# Patient Record
Sex: FEMALE | URBAN - NONMETROPOLITAN AREA
[De-identification: names, ages, dates, MRNs, and addresses within clinical notes are randomized per-mention and may not be internally consistent; named-entity substitution may affect disease eponyms.]

---

## 2017-06-01 ENCOUNTER — APPOINTMENT (OUTPATIENT)
Age: 62
Setting detail: DERMATOLOGY
End: 2017-06-06

## 2017-06-01 DIAGNOSIS — L40.0 PSORIASIS VULGARIS: ICD-10-CM

## 2017-06-01 DIAGNOSIS — Z79.899 OTHER LONG TERM (CURRENT) DRUG THERAPY: ICD-10-CM

## 2017-06-01 PROBLEM — L29.8 OTHER PRURITUS: Status: ACTIVE | Noted: 2017-06-01

## 2017-06-01 PROBLEM — L70.0 ACNE VULGARIS: Status: ACTIVE | Noted: 2017-06-01

## 2017-06-01 PROCEDURE — OTHER TREATMENT REGIMEN: OTHER

## 2017-06-01 PROCEDURE — OTHER ORDER TESTS: OTHER

## 2017-06-01 PROCEDURE — 99213 OFFICE O/P EST LOW 20 MIN: CPT

## 2017-06-01 PROCEDURE — OTHER PRESCRIPTION: OTHER

## 2017-06-01 PROCEDURE — OTHER HIGH RISK MEDICATION MONITORING: OTHER

## 2017-06-01 PROCEDURE — OTHER COUNSELING: OTHER

## 2017-06-01 RX ORDER — FOLIC ACID 1 MG
TABLET ORAL
Qty: 30 | Refills: 11 | Status: ERX

## 2017-06-01 RX ORDER — METHOTREXATE SODIUM 2.5 MG/1
TABLET ORAL
Qty: 20 | Refills: 2 | Status: ERX

## 2017-06-01 ASSESSMENT — LOCATION DETAILED DESCRIPTION DERM
LOCATION DETAILED: PERIUMBILICAL SKIN
LOCATION DETAILED: LEFT DISTAL POSTERIOR THIGH
LOCATION DETAILED: RIGHT ELBOW
LOCATION DETAILED: LEFT PROXIMAL DORSAL FOREARM
LOCATION DETAILED: RIGHT PROXIMAL DORSAL FOREARM
LOCATION DETAILED: INFERIOR LUMBAR SPINE
LOCATION DETAILED: LEFT ELBOW
LOCATION DETAILED: RIGHT DISTAL POSTERIOR THIGH

## 2017-06-01 ASSESSMENT — LOCATION SIMPLE DESCRIPTION DERM
LOCATION SIMPLE: LEFT POSTERIOR THIGH
LOCATION SIMPLE: RIGHT POSTERIOR THIGH
LOCATION SIMPLE: LEFT ELBOW
LOCATION SIMPLE: RIGHT ELBOW
LOCATION SIMPLE: RIGHT FOREARM
LOCATION SIMPLE: LOWER BACK
LOCATION SIMPLE: LEFT FOREARM
LOCATION SIMPLE: ABDOMEN

## 2017-06-01 ASSESSMENT — LOCATION ZONE DERM
LOCATION ZONE: LEG
LOCATION ZONE: ARM
LOCATION ZONE: TRUNK

## 2017-06-01 NOTE — PROCEDURE: TREATMENT REGIMEN
Plan: Patient is to have labs drawn in 3 months, if labs are \"OK\" to continue taking the methotrexate then we will send in another 3 month supply.\\nBiological treatment and risks briefly discussed. Patient prefers to stay with methotrexate. Other Instructions: Patient is to have labs drawn in 3 months, if labs are \"OK\" to continue taking the methotrexate then we will send in another 3 month supply.\\nBiological treatment and risks briefly discussed. Patient prefers to stay with methotrexate.

## 2017-09-08 RX ORDER — METHOTREXATE SODIUM 2.5 MG/1
5 TABLET ORAL
Qty: 20 | Refills: 2 | Status: ERX

## 2017-12-04 ENCOUNTER — APPOINTMENT (OUTPATIENT)
Age: 62
Setting detail: DERMATOLOGY
End: 2017-12-04

## 2017-12-04 DIAGNOSIS — Z79.899 OTHER LONG TERM (CURRENT) DRUG THERAPY: ICD-10-CM

## 2017-12-04 DIAGNOSIS — L40.0 PSORIASIS VULGARIS: ICD-10-CM

## 2017-12-04 PROCEDURE — OTHER COUNSELING: OTHER

## 2017-12-04 PROCEDURE — OTHER TREATMENT REGIMEN: OTHER

## 2017-12-04 PROCEDURE — OTHER PRESCRIPTION: OTHER

## 2017-12-04 PROCEDURE — OTHER HIGH RISK MEDICATION MONITORING: OTHER

## 2017-12-04 PROCEDURE — 99213 OFFICE O/P EST LOW 20 MIN: CPT

## 2017-12-04 RX ORDER — CLOBETASOL PROPIONATE 0.5 MG/G
CREAM TOPICAL BID
Qty: 1 | Refills: 2 | Status: ERX

## 2017-12-04 RX ORDER — CLOBETASOL PROPIONATE 0.5 MG/ML
SOLUTION TOPICAL
Qty: 1 | Refills: 3 | Status: ERX

## 2017-12-04 RX ORDER — CALCIPOTRIENE 50 UG/G
CREAM TOPICAL BID
Qty: 1 | Refills: 4 | Status: ERX

## 2017-12-04 ASSESSMENT — LOCATION SIMPLE DESCRIPTION DERM
LOCATION SIMPLE: RIGHT ELBOW
LOCATION SIMPLE: LEFT ELBOW

## 2017-12-04 ASSESSMENT — LOCATION ZONE DERM: LOCATION ZONE: ARM

## 2017-12-04 ASSESSMENT — LOCATION DETAILED DESCRIPTION DERM
LOCATION DETAILED: RIGHT ELBOW
LOCATION DETAILED: LEFT ELBOW

## 2017-12-04 ASSESSMENT — BSA PSORIASIS: % BODY COVERED IN PSORIASIS: 4

## 2017-12-04 NOTE — PROCEDURE: TREATMENT REGIMEN
Other Instructions: Will obtain labs from [facility] and send Rx if labs are ok Plan: Will obtain labs from [facility] and send Rx if labs are ok

## 2017-12-04 NOTE — PROCEDURE: TREATMENT REGIMEN
Continue Regimen: Methotrexate 2.5mg take 5 tablets once weekly (pending labs) \\nFolic Acid one tablet daily\\nClobetasol 0.05% topical cream on weekends only \\nDovonex 0.005% topical cream twice daily to psoriasis \\nClobetasol 0.05% scalp solution apply after washing hair 3 times weekly

## 2017-12-05 RX ORDER — METHOTREXATE SODIUM 2.5 MG/1
TABLET ORAL
Qty: 20 | Refills: 5 | Status: ERX

## 2018-06-14 ENCOUNTER — APPOINTMENT (OUTPATIENT)
Age: 63
Setting detail: DERMATOLOGY
End: 2018-06-14

## 2018-06-14 DIAGNOSIS — L40.0 PSORIASIS VULGARIS: ICD-10-CM

## 2018-06-14 DIAGNOSIS — Z79.899 OTHER LONG TERM (CURRENT) DRUG THERAPY: ICD-10-CM

## 2018-06-14 PROBLEM — L70.0 ACNE VULGARIS: Status: ACTIVE | Noted: 2018-06-14

## 2018-06-14 PROCEDURE — OTHER COUNSELING: OTHER

## 2018-06-14 PROCEDURE — OTHER PRESCRIPTION: OTHER

## 2018-06-14 PROCEDURE — OTHER TREATMENT REGIMEN: OTHER

## 2018-06-14 PROCEDURE — 99213 OFFICE O/P EST LOW 20 MIN: CPT

## 2018-06-14 PROCEDURE — OTHER MIPS QUALITY: OTHER

## 2018-06-14 PROCEDURE — OTHER ORDER TESTS: OTHER

## 2018-06-14 PROCEDURE — OTHER HIGH RISK MEDICATION MONITORING: OTHER

## 2018-06-14 PROCEDURE — OTHER LAB REPORTS REVIEWED: OTHER

## 2018-06-14 RX ORDER — METHOTREXATE SODIUM 2.5 MG/1
5 TABLET ORAL
Qty: 20 | Refills: 6 | Status: ERX

## 2018-06-14 RX ORDER — FOLIC ACID 1 MG
1 TABLET ORAL DAILY
Qty: 30 | Refills: 11 | Status: ERX

## 2018-06-14 ASSESSMENT — LOCATION DETAILED DESCRIPTION DERM
LOCATION DETAILED: LEFT DISTAL POSTERIOR UPPER ARM
LOCATION DETAILED: LEFT ELBOW
LOCATION DETAILED: RIGHT ULNAR PALM
LOCATION DETAILED: LEFT RADIAL PALM

## 2018-06-14 ASSESSMENT — LOCATION ZONE DERM
LOCATION ZONE: ARM
LOCATION ZONE: HAND

## 2018-06-14 ASSESSMENT — LOCATION SIMPLE DESCRIPTION DERM
LOCATION SIMPLE: LEFT ELBOW
LOCATION SIMPLE: RIGHT HAND
LOCATION SIMPLE: LEFT HAND
LOCATION SIMPLE: LEFT POSTERIOR UPPER ARM

## 2018-06-14 ASSESSMENT — BSA PSORIASIS: % BODY COVERED IN PSORIASIS: 4

## 2018-06-14 ASSESSMENT — PGA PSORIASIS: PGA PSORIASIS: MINIMAL (MINIMAL PLAQUE ELEVATION, FAINT ERYTHEMA, OCCASIONAL FINE SCALE)

## 2018-06-14 NOTE — PROCEDURE: MIPS QUALITY
Quality 226: Preventive Care And Screening: Tobacco Use: Screening And Cessation Intervention: Tobacco Screening OR Tobacco Cessation Intervention not Performed Reason Not Otherwise Specified
Quality 130: Documentation Of Current Medications In The Medical Record: Current Medications Documented
Detail Level: Detailed
Quality 431: Preventive Care And Screening: Unhealthy Alcohol Use - Screening: Patient screened for unhealthy alcohol use using a single question and scores less than 2 times per year

## 2018-06-14 NOTE — PROCEDURE: TREATMENT REGIMEN
Continue Regimen: methotrexate 2.5 mg - 5 tabs weekly.\\nfolic acid 1mg- take one tablet daily
Action 3: Continue
Detail Level: Simple
Other Instructions: remain abstinent from alcohol

## 2019-02-20 ENCOUNTER — APPOINTMENT (OUTPATIENT)
Age: 64
Setting detail: DERMATOLOGY
End: 2019-02-21

## 2019-02-20 DIAGNOSIS — L40.0 PSORIASIS VULGARIS: ICD-10-CM

## 2019-02-20 DIAGNOSIS — Z79.899 OTHER LONG TERM (CURRENT) DRUG THERAPY: ICD-10-CM

## 2019-02-20 PROBLEM — L29.8 OTHER PRURITUS: Status: ACTIVE | Noted: 2019-02-20

## 2019-02-20 PROCEDURE — OTHER COUNSELING: OTHER

## 2019-02-20 PROCEDURE — OTHER TREATMENT REGIMEN: OTHER

## 2019-02-20 PROCEDURE — OTHER ORDER TESTS: OTHER

## 2019-02-20 PROCEDURE — OTHER PRESCRIPTION: OTHER

## 2019-02-20 PROCEDURE — 99214 OFFICE O/P EST MOD 30 MIN: CPT

## 2019-02-20 PROCEDURE — OTHER HIGH RISK MEDICATION MONITORING: OTHER

## 2019-02-20 RX ORDER — FOLIC ACID 1 MG
TABLET ORAL QD
Qty: 30 | Refills: 11 | Status: ERX

## 2019-02-20 RX ORDER — METHOTREXATE SODIUM 2.5 MG/1
TABLET ORAL QWEEK
Qty: 20 | Refills: 5 | Status: ERX

## 2019-02-20 ASSESSMENT — LOCATION SIMPLE DESCRIPTION DERM
LOCATION SIMPLE: LEFT ELBOW
LOCATION SIMPLE: RIGHT ELBOW

## 2019-02-20 ASSESSMENT — LOCATION DETAILED DESCRIPTION DERM
LOCATION DETAILED: RIGHT ELBOW
LOCATION DETAILED: LEFT ELBOW

## 2019-02-20 ASSESSMENT — LOCATION ZONE DERM: LOCATION ZONE: ARM

## 2019-02-20 NOTE — PROCEDURE: TREATMENT REGIMEN
Action 2: Continue
Left voicemail message for patient to call back on 8/31/18 & 9/7/18
Spoke with patient, scheduled surgery.   Left detailed message for Sury Banuelos in surgery date
Continue Regimen: methotrexate 2.5 mg - 5 tabs weekly.\\nfolic acid 1mg- take one tablet daily
Other Instructions: remain abstinent from alcohol
Detail Level: Simple